# Patient Record
Sex: MALE | Race: WHITE | NOT HISPANIC OR LATINO | Employment: OTHER | URBAN - METROPOLITAN AREA
[De-identification: names, ages, dates, MRNs, and addresses within clinical notes are randomized per-mention and may not be internally consistent; named-entity substitution may affect disease eponyms.]

---

## 2017-01-06 ENCOUNTER — PREP FOR SURGERY (OUTPATIENT)
Dept: BARIATRICS/WEIGHT MGMT | Facility: CLINIC | Age: 67
End: 2017-01-06

## 2017-01-06 DIAGNOSIS — K43.2 INCISIONAL HERNIA, WITHOUT OBSTRUCTION OR GANGRENE: Primary | ICD-10-CM

## 2017-01-17 ENCOUNTER — APPOINTMENT (OUTPATIENT)
Dept: PREADMISSION TESTING | Facility: HOSPITAL | Age: 67
End: 2017-01-17

## 2017-01-17 LAB
HCT VFR BLD AUTO: 44.7 % (ref 38.9–50.9)
POTASSIUM BLD-SCNC: 4.8 MMOL/L (ref 3.5–5.5)

## 2017-01-17 PROCEDURE — 84132 ASSAY OF SERUM POTASSIUM: CPT | Performed by: SURGERY

## 2017-01-17 PROCEDURE — 93010 ELECTROCARDIOGRAM REPORT: CPT | Performed by: INTERNAL MEDICINE

## 2017-01-17 PROCEDURE — 36415 COLL VENOUS BLD VENIPUNCTURE: CPT

## 2017-01-17 PROCEDURE — 85014 HEMATOCRIT: CPT | Performed by: SURGERY

## 2017-01-17 PROCEDURE — 93005 ELECTROCARDIOGRAM TRACING: CPT

## 2017-01-23 ENCOUNTER — LAB REQUISITION (OUTPATIENT)
Dept: LAB | Facility: HOSPITAL | Age: 67
End: 2017-01-23

## 2017-01-23 DIAGNOSIS — K43.0 INCISIONAL HERNIA, WITH OBSTRUCTION, WITHOUT GANGRENE: ICD-10-CM

## 2017-01-23 PROCEDURE — 88302 TISSUE EXAM BY PATHOLOGIST: CPT | Performed by: SURGERY

## 2017-01-24 LAB
CYTO UR: NORMAL
LAB AP CASE REPORT: NORMAL
LAB AP CLINICAL INFORMATION: NORMAL
Lab: NORMAL
PATH REPORT.FINAL DX SPEC: NORMAL
PATH REPORT.GROSS SPEC: NORMAL

## 2017-02-03 ENCOUNTER — OFFICE VISIT (OUTPATIENT)
Dept: BARIATRICS/WEIGHT MGMT | Facility: CLINIC | Age: 67
End: 2017-02-03

## 2017-02-03 VITALS
RESPIRATION RATE: 18 BRPM | TEMPERATURE: 97.8 F | WEIGHT: 204 LBS | SYSTOLIC BLOOD PRESSURE: 98 MMHG | BODY MASS INDEX: 32.78 KG/M2 | DIASTOLIC BLOOD PRESSURE: 62 MMHG | HEIGHT: 66 IN | OXYGEN SATURATION: 99 % | HEART RATE: 76 BPM

## 2017-02-03 DIAGNOSIS — K43.2 INCISIONAL HERNIA, WITHOUT OBSTRUCTION OR GANGRENE: ICD-10-CM

## 2017-02-03 DIAGNOSIS — Z98.890 STATUS POST SURGERY: Primary | ICD-10-CM

## 2017-02-03 PROCEDURE — 99024 POSTOP FOLLOW-UP VISIT: CPT | Performed by: PHYSICIAN ASSISTANT

## 2017-02-03 RX ORDER — HYDROCODONE BITARTRATE AND ACETAMINOPHEN 7.5; 325 MG/1; MG/1
TABLET ORAL
COMMUNITY
Start: 2017-01-23 | End: 2017-09-18

## 2017-02-03 NOTE — PROGRESS NOTES
[facility] BARIATRIC SURGERY  [address]  [facility] [address]  [phone]    Eloy Molina.  1950    Date of Visit:   2/3/2017    Reason for Visit:  1 week Follow Up IHR  HPI:    66 y.o. year old male s/p LSG/HHR by GDW 9/17/15, s/p uneventful Incisional HR by Dr. Olson  on 1/23/17. Doing well now. No further issues w/abdominal pain. Tolerating PO w/out issue. Denies fever, nausea, vomiting and abdominal pain. Bowels are moving. Voiding well.   Past Medical History   Diagnosis Date   • Abnormal chest x-ray      extensive pulm fibrosis   • Abnormal PFTs (pulmonary function tests)      severe restriction. 59/62, dlco39   • Benign essential HTN    • CAD (coronary artery disease)      s/p stenting in 2006   • CHF (congestive heart failure)      with diastolic dysfunction   • Hiatal hernia      asx, path DE neg   • History of MI (myocardial infarction)      2006   • Hyperlipidemia    • KATIE on CPAP      compliant w/3L O2 nightly     Past Surgical History   Procedure Laterality Date   • Appendectomy  2015   • Knee surgery Bilateral 2012     TKR and partial left   • Colonoscopy  2012     benign polyps   • Coronary angioplasty with stent placement  2006     Dr. Aranda       Current Outpatient Prescriptions:   •  aspirin 81 MG tablet, Take  by mouth daily., Disp: , Rfl:   •  carvedilol (COREG) 3.125 MG tablet, Take  by mouth 2 (two) times a day., Disp: , Rfl:   •  cetirizine (ZyrTEC) 10 MG tablet, Take  by mouth daily., Disp: , Rfl:   •  clopidogrel (PLAVIX) 75 MG tablet, Take  by mouth daily., Disp: , Rfl:   •  ezetimibe-simvastatin (VYTORIN) 10-80 MG per tablet, Take  by mouth daily., Disp: , Rfl:   •  fluocinonide (LIDEX) 0.05 % gel, Apply  topically., Disp: , Rfl:   •  HYDROcodone-acetaminophen (NORCO) 7.5-325 MG per tablet, , Disp: , Rfl:   •  lisinopril (PRINIVIL,ZESTRIL) 10 MG tablet, Take  by mouth daily., Disp: , Rfl:   •  Multiple Vitamins-Iron (DAILY  "MULTIVITAMINS/IRON PO), Take  by mouth daily., Disp: , Rfl:   •  simvastatin (ZOCOR) 40 MG tablet, , Disp: , Rfl:   No Known Allergies  Social History     Social History   • Marital status:      Spouse name: N/A   • Number of children: N/A   • Years of education: N/A     Occupational History   • Not on file.     Social History Main Topics   • Smoking status: Former Smoker     Packs/day: 2.00     Years: 25.00     Quit date: 1995   • Smokeless tobacco: Never Used   • Alcohol use No      Comment: alcoholic sober since 1983   • Drug use: No   • Sexual activity: Not on file     Other Topics Concern   • Not on file     Social History Narrative    Lives in [place] w/wife    Retired, previously worked in [employer] sales     Visit Vitals   • BP 98/62 (BP Location: Left arm, Patient Position: Sitting, Cuff Size: Large Adult)   • Pulse 76   • Temp 97.8 °F (36.6 °C) (Temporal Artery )   • Resp 18   • Ht 66\" (167.6 cm)   • Wt 204 lb (92.5 kg)   • SpO2 99%   • BMI 32.93 kg/m2        General Appearance:  Well nourished.  In no acute distress.  Patient was observed to be obese.  Oral Cavity:   Buccal Mucosa: The buccal mucosa was moist.  Lungs:  Normal breath sounds/voice sounds.  Cardiovascular:   Heart Rate And Rhythm: Heart rate and rhythm normal.   Edema: No calf tenderness.  Abdomen:  Abdomen: incisions healing well.   Auscultation: The bowel sounds were normal.   Palpation: No mass was palpated in the abdomen, Soft, nontender, and nondistended.   Hernia: No hernia was discovered.  Musculoskeletal System:   General/bilateral: No edema present in extremities.  Normal movement of all extremities.  Neurological:  Was alert and oriented.   Gait And Stance: Gait and stance were normal.  Psychiatric:   Attitude: The attitude was cooperative.   Mood: Mood pleasant.  Skin:  The complexion was normal.  The skin moisture was normal and the skin temperature was normal.    Assessment:   2 weeks s/p Incisional HR;  the " patient is doing well.           Plan:   Call w/issues and concerns  RTC with problems.    FLOR Harper  2/3/2017

## 2017-09-18 ENCOUNTER — OFFICE VISIT (OUTPATIENT)
Dept: BARIATRICS/WEIGHT MGMT | Facility: CLINIC | Age: 67
End: 2017-09-18

## 2017-09-18 VITALS
RESPIRATION RATE: 18 BRPM | BODY MASS INDEX: 30.86 KG/M2 | OXYGEN SATURATION: 95 % | HEIGHT: 66 IN | WEIGHT: 192 LBS | TEMPERATURE: 97.1 F | SYSTOLIC BLOOD PRESSURE: 109 MMHG | DIASTOLIC BLOOD PRESSURE: 71 MMHG | HEART RATE: 86 BPM

## 2017-09-18 DIAGNOSIS — I25.10 CHRONIC CORONARY ARTERY DISEASE: Primary | ICD-10-CM

## 2017-09-18 DIAGNOSIS — E55.9 VITAMIN D DEFICIENCY: ICD-10-CM

## 2017-09-18 DIAGNOSIS — E78.5 HYPERLIPIDEMIA, UNSPECIFIED HYPERLIPIDEMIA TYPE: ICD-10-CM

## 2017-09-18 DIAGNOSIS — R79.0 ABNORMAL BLOOD LEVEL OF IRON: ICD-10-CM

## 2017-09-18 DIAGNOSIS — R10.13 DYSPEPSIA: ICD-10-CM

## 2017-09-18 DIAGNOSIS — I10 BENIGN ESSENTIAL HTN: ICD-10-CM

## 2017-09-18 PROCEDURE — 99214 OFFICE O/P EST MOD 30 MIN: CPT | Performed by: PHYSICIAN ASSISTANT

## 2017-09-18 NOTE — PROGRESS NOTES
[facility] Bariatric Surgery  [address]  [facility] [address]  [phone]        Patient Name:  Eloy Molina.  :  1950      Date of Visit: 2017      Reason for Visit:   Annual Eval      HPI: Eloy Molina is a 67 y.o. male 2 years s/p LSG/HHR by GDW on 9/17/15, followed by lap IHR 17.    Doing well - feeling great.  No issues/concerns. Denies dysphagia, reflux, nausea, vomiting and abdominal pain.  Getting minimum 100g prot/day and carbs 130-165g/day.  Has logged consistently >1000 days on [employer].  Gets 2000 inocencia/day.  Extremely pleased.  Down from a size 58 waist to 40.  Has reached his weight loss goal.  Rewarded himself and bought a Porchse.    Last labs 2016 WNL.  Continues on a daily MVI.  Exercises 5 days/week - avid golfer.       Presurgery weight: 280 pounds.  Today's weight is 192 lb (87.1 kg) pounds, today's  Body mass index is 30.99 kg/(m^2)., and his weight loss since surgery is 88 pounds.      Past Medical History:   Diagnosis Date   • Abnormal chest x-ray     extensive pulm fibrosis   • Abnormal PFTs (pulmonary function tests)      - severe restriction. 59/62, dlco39   • Benign essential HTN    • CAD (coronary artery disease)     s/p stenting in    • CHF (congestive heart failure)     with diastolic dysfunction   • Dyspepsia    • Dyspnea on exertion    • History of MI (myocardial infarction)        • Hyperlipidemia    • Joint pain    • KATIE on CPAP     compliant      Past Surgical History:   Procedure Laterality Date   • APPENDECTOMY     • COLONOSCOPY  2012    benign polyps   • CORONARY ANGIOPLASTY WITH STENT PLACEMENT      Dr. Aranda   • KNEE SURGERY Bilateral 2012    TKR and partial left     Outpatient Prescriptions Marked as Taking for the 17 encounter (Office Visit) with FLOR Quevedo   Medication Sig Dispense Refill   • aspirin 81 MG tablet Take  by mouth daily.     • carvedilol (COREG)  "3.125 MG tablet Take  by mouth 2 (two) times a day.     • cetirizine (ZyrTEC) 10 MG tablet Take  by mouth daily.     • clopidogrel (PLAVIX) 75 MG tablet Take  by mouth daily.     • fluocinonide (LIDEX) 0.05 % gel Apply  topically.     • lisinopril (PRINIVIL,ZESTRIL) 10 MG tablet Take  by mouth daily.     • Multiple Vitamins-Iron (DAILY MULTIVITAMINS/IRON PO) Take  by mouth daily.     • simvastatin (ZOCOR) 40 MG tablet          No Known Allergies    Social History     Social History   • Marital status:      Spouse name: N/A   • Number of children: N/A   • Years of education: N/A     Occupational History   • Not on file.     Social History Main Topics   • Smoking status: Former Smoker     Packs/day: 2.00     Years: 25.00     Quit date: 1995   • Smokeless tobacco: Never Used   • Alcohol use No      Comment: alcoholic sober since 1983   • Drug use: No   • Sexual activity: Not on file     Other Topics Concern   • Not on file     Social History Narrative    Lives in [place] w/wife    Retired, previously worked in [employer] sales       /71 (BP Location: Right arm, Patient Position: Sitting, Cuff Size: Large Adult)  Pulse 86  Temp 97.1 °F (36.2 °C) (Temporal Artery )   Resp 18  Ht 66\" (167.6 cm)  Wt 192 lb (87.1 kg)  SpO2 95%  BMI 30.99 kg/m2    Physical Exam   Constitutional: He appears well-developed and well-nourished.   Cardiovascular: Normal rate and regular rhythm.    Pulmonary/Chest: Effort normal.   Abdominal: Soft. There is no tenderness. No hernia.   Musculoskeletal: Normal range of motion.   Neurological: He is alert.   Skin: Skin is warm and dry.   Psychiatric: He has a normal mood and affect.         Assessment:  2 years s/p LSG/HHR by GDW on 9/17/15, followed by kate IHR 1/23/17.    ICD-10-CM ICD-9-CM   1. Chronic coronary artery disease I25.10 414.00   2. Benign essential HTN I10 401.1   3. Hyperlipidemia, unspecified hyperlipidemia type E78.5 272.4   4. Dyspepsia R10.13 536.8 "   5. Vitamin D deficiency E55.9 268.9   6. Abnormal blood level of iron R79.0 790.6       Plan:  Continue w/ good food choices and healthy habits.  Keep tracking intake.  Continue routine exercise.  Routine bariatric labs ordered.  Continue vitamins w/ adjustments pending lab results.  Call w/ problems/concerns.     The patient was instructed to follow up in 1 year, sooner if needed.      Total time spent w/ patient 25 minutes and 15 minutes spent counseling the patient on nutrition and necessary dietary/lifestyle modifications.

## 2017-09-20 LAB
25(OH)D3+25(OH)D2 SERPL-MCNC: 34.9 NG/ML
A-TOCOPHEROL VIT E SERPL-MCNC: 10.8 MG/L (ref 5.3–17.5)
ALBUMIN SERPL-MCNC: 4.2 G/DL (ref 3.2–4.8)
ALBUMIN/GLOB SERPL: 1.6 G/DL (ref 1.5–2.5)
ALP SERPL-CCNC: 126 U/L (ref 25–100)
ALT SERPL-CCNC: 21 U/L (ref 7–40)
AST SERPL-CCNC: 22 U/L (ref 0–33)
BASOPHILS # BLD AUTO: 0.04 10*3/MM3 (ref 0–0.2)
BASOPHILS NFR BLD AUTO: 0.5 % (ref 0–1)
BILIRUB SERPL-MCNC: 0.7 MG/DL (ref 0.3–1.2)
BUN SERPL-MCNC: 18 MG/DL (ref 9–23)
BUN/CREAT SERPL: 22.5 (ref 7–25)
CALCIUM SERPL-MCNC: 9.5 MG/DL (ref 8.7–10.4)
CHLORIDE SERPL-SCNC: 102 MMOL/L (ref 99–109)
CO2 SERPL-SCNC: 29 MMOL/L (ref 20–31)
CREAT SERPL-MCNC: 0.8 MG/DL (ref 0.6–1.3)
EOSINOPHIL # BLD AUTO: 0.16 10*3/MM3 (ref 0–0.3)
EOSINOPHIL NFR BLD AUTO: 2 % (ref 0–3)
ERYTHROCYTE [DISTWIDTH] IN BLOOD BY AUTOMATED COUNT: 13.4 % (ref 11.3–14.5)
FERRITIN SERPL-MCNC: 26 NG/ML (ref 22–322)
FOLATE SERPL-MCNC: 18.56 NG/ML (ref 3.2–20)
GLOBULIN SER CALC-MCNC: 2.7 GM/DL
GLUCOSE SERPL-MCNC: 72 MG/DL (ref 70–100)
HCT VFR BLD AUTO: 44.1 % (ref 38.9–50.9)
HGB BLD-MCNC: 14.6 G/DL (ref 13.1–17.5)
IMM GRANULOCYTES # BLD: 0.02 10*3/MM3 (ref 0–0.03)
IMM GRANULOCYTES NFR BLD: 0.3 % (ref 0–0.6)
IRON SERPL-MCNC: 121 MCG/DL (ref 50–175)
LYMPHOCYTES # BLD AUTO: 2.04 10*3/MM3 (ref 0.6–4.8)
LYMPHOCYTES NFR BLD AUTO: 25.8 % (ref 24–44)
MAGNESIUM SERPL-MCNC: 2 MG/DL (ref 1.3–2.7)
MCH RBC QN AUTO: 31.9 PG (ref 27–31)
MCHC RBC AUTO-ENTMCNC: 33.1 G/DL (ref 32–36)
MCV RBC AUTO: 96.3 FL (ref 80–99)
METHYLMALONATE SERPL-SCNC: 239 NMOL/L (ref 0–378)
MONOCYTES # BLD AUTO: 0.79 10*3/MM3 (ref 0–1)
MONOCYTES NFR BLD AUTO: 10 % (ref 0–12)
NEUTROPHILS # BLD AUTO: 4.86 10*3/MM3 (ref 1.5–8.3)
NEUTROPHILS NFR BLD AUTO: 61.4 % (ref 41–71)
PHOSPHATE SERPL-MCNC: 3.5 MG/DL (ref 2.4–5.1)
PLATELET # BLD AUTO: 248 10*3/MM3 (ref 150–450)
POTASSIUM SERPL-SCNC: 5.2 MMOL/L (ref 3.5–5.5)
PREALB SERPL-MCNC: 19 MG/DL (ref 10–36)
PROT SERPL-MCNC: 6.9 G/DL (ref 5.7–8.2)
PTH-INTACT SERPL-MCNC: 28 PG/ML (ref 15–65)
RBC # BLD AUTO: 4.58 10*6/MM3 (ref 4.2–5.76)
SODIUM SERPL-SCNC: 140 MMOL/L (ref 132–146)
VIT A SERPL-MCNC: 45 UG/DL (ref 24–85)
VIT B1 BLD-SCNC: 187 NMOL/L (ref 66.5–200)
WBC # BLD AUTO: 7.91 10*3/MM3 (ref 3.5–10.8)
ZINC SERPL-MCNC: 77 UG/DL (ref 56–134)

## 2018-09-17 ENCOUNTER — OFFICE VISIT (OUTPATIENT)
Dept: BARIATRICS/WEIGHT MGMT | Facility: CLINIC | Age: 68
End: 2018-09-17

## 2018-09-17 VITALS
WEIGHT: 193.01 LBS | DIASTOLIC BLOOD PRESSURE: 60 MMHG | HEIGHT: 66 IN | OXYGEN SATURATION: 99 % | SYSTOLIC BLOOD PRESSURE: 112 MMHG | HEART RATE: 72 BPM | BODY MASS INDEX: 31.02 KG/M2 | TEMPERATURE: 97.7 F | RESPIRATION RATE: 18 BRPM

## 2018-09-17 DIAGNOSIS — Z98.84 S/P BARIATRIC SURGERY: ICD-10-CM

## 2018-09-17 DIAGNOSIS — R10.13 DYSPEPSIA: Primary | ICD-10-CM

## 2018-09-17 PROCEDURE — 99213 OFFICE O/P EST LOW 20 MIN: CPT | Performed by: PHYSICIAN ASSISTANT

## 2018-09-17 RX ORDER — AMOXICILLIN 500 MG/1
1000 CAPSULE ORAL 2 TIMES DAILY
COMMUNITY
End: 2018-09-17

## 2018-09-17 NOTE — PROGRESS NOTES
"[facility] Bariatric Surgery  [address]  [facility] [address] [phone]        Patient Name:  lEoy Molina.  :  1950      Date of Visit: 2018      Reason for Visit:   Annual Eval - 3 years postop    HPI: Eloy Molina is a 68 y.o. male s/p LSG/HHR by GDW on 9/17/15, followed by lap IHR 17.    Feeling really good.  \"Life-changing.\"  Maintaining weight loss.  Only issue is heartburn/indigestion.  Uses Gaviscon prn which typically works.  Stays active, playing golf and now bowling.  Still logs faithfully on [employer].  Gets 1800 inocencia/day w/ 100g prot/day.  Walks 10,000 steps/day.  Labs 2017 WNL  Continues on a daily MVI.       Presurgery weight: 280 pounds.  Today's weight is 87.5 kg (193 lb 0.2 oz) pounds, today's  Body mass index is 31.15 kg/m²., and his weight loss since surgery is 87 pounds.      Past Medical History:   Diagnosis Date   • Abnormal chest x-ray     extensive pulm fibrosis   • Abnormal PFTs (pulmonary function tests)      - severe restriction. 59/62, dlco39   • Benign essential HTN    • CAD (coronary artery disease)     s/p stenting in    • CHF (congestive heart failure) (CMS/HCC)     with diastolic dysfunction   • Dyspepsia    • Dyspnea on exertion    • History of MI (myocardial infarction)        • Hyperlipidemia    • Joint pain    • Sleep apnea     resolved w/ WLS, no longer requires device     Past Surgical History:   Procedure Laterality Date   • APPENDECTOMY      (lap) Dr. Stokes, [facility]   • COLONOSCOPY      benign polyps   • CORONARY ANGIOPLASTY WITH STENT PLACEMENT      Dr. Aranda   • GASTRIC SLEEVE LAPAROSCOPIC      s/p LSG/HHR by GDW on 9/17/15   • INCISIONAL HERNIA REPAIR      lap IHR by GDW on 17   • KNEE ARTHROPLASTY, PARTIAL REPLACEMENT Left    • REPLACEMENT TOTAL KNEE Right      Outpatient Prescriptions Marked as Taking for the 18 encounter (Office Visit) with " "Sepideh Shepherd PA   Medication Sig Dispense Refill   • aspirin 81 MG tablet Take  by mouth daily.     • carvedilol (COREG) 3.125 MG tablet Take  by mouth 2 (two) times a day.     • cetirizine (ZyrTEC) 10 MG tablet Take  by mouth daily.     • clopidogrel (PLAVIX) 75 MG tablet Take  by mouth daily.     • fluocinonide (LIDEX) 0.05 % gel Apply  topically.     • lisinopril (PRINIVIL,ZESTRIL) 10 MG tablet Take  by mouth daily.     • Multiple Vitamins-Iron (DAILY MULTIVITAMINS/IRON PO) Take  by mouth daily.     • simvastatin (ZOCOR) 40 MG tablet      • [DISCONTINUED] amoxicillin (AMOXIL) 500 MG capsule Take 1,000 mg by mouth 2 (Two) Times a Day.         No Known Allergies    Social History     Social History   • Marital status:      Spouse name: N/A   • Number of children: N/A   • Years of education: N/A     Occupational History   • Not on file.     Social History Main Topics   • Smoking status: Former Smoker     Packs/day: 2.00     Years: 25.00     Quit date: 1995   • Smokeless tobacco: Never Used   • Alcohol use No      Comment: alcoholic sober since 1983   • Drug use: No   • Sexual activity: Not on file     Other Topics Concern   • Not on file     Social History Narrative    Lives in [place] w/wife    Retired, previously worked in [employer] sales       /60 (BP Location: Left arm, Patient Position: Sitting, Cuff Size: Large Adult)   Pulse 72   Temp 97.7 °F (36.5 °C) (Temporal Artery )   Resp 18   Ht 167.6 cm (66\")   Wt 87.5 kg (193 lb 0.2 oz)   SpO2 99%   BMI 31.15 kg/m²     Physical Exam   Constitutional: He appears well-developed and well-nourished.   Cardiovascular: Normal rate and regular rhythm.    Pulmonary/Chest: Effort normal.   Abdominal: Soft. There is no tenderness. No hernia.   Musculoskeletal: Normal range of motion.   Neurological: He is alert.   Skin: Skin is warm and dry.   Psychiatric: He has a normal mood and affect.         Assessment:  3 years s/p LSG/HHR by HEBER on " 9/17/15, followed by kate IHR 1/23/17.    ICD-10-CM ICD-9-CM   1. Dyspepsia R10.13 536.8   2. S/P bariatric surgery Z98.84 V45.86       Plan:  Doing well.  Continue w/ healthy habits.  Keep tracking intake - focus on high protein, low carb, 1500 inocencia/day.  No labs today.  Does not wish to w/up heartburn further at this point.  Call w/ any other problems/concerns.     The patient was instructed to follow up in 1 year, sooner if needed.

## 2019-03-12 ENCOUNTER — TELEPHONE (OUTPATIENT)
Dept: BARIATRICS/WEIGHT MGMT | Facility: CLINIC | Age: 69
End: 2019-03-12

## 2019-03-12 RX ORDER — OMEPRAZOLE 40 MG/1
40 CAPSULE, DELAYED RELEASE ORAL DAILY
Qty: 30 CAPSULE | Refills: 0 | Status: SHIPPED | OUTPATIENT
Start: 2019-03-12 | End: 2019-03-18 | Stop reason: SDUPTHER

## 2019-03-12 NOTE — TELEPHONE ENCOUNTER
Pt called in stating that he is having severe problems with acid reflux, and it continuously is getting worse and does not know what he needs to do or wants to know if he needs to be seen. Please advise, thank you.

## 2019-03-13 NOTE — TELEPHONE ENCOUNTER
Notified pt that you sent in a rx for Omeprazole 40mg into his pharmacy and he needs to start taking that daily. I also let pt know that you want him to be seen in the office for further eval. Pt verbalized understanding.

## 2019-03-13 NOTE — TELEPHONE ENCOUNTER
Tried to contact pt, no answer. LVM   Writer spoke with patient and informed them of the information below.  Patient verbalized understanding of information and stated they will contact our office if her symptoms do not improve in the next few days.

## 2019-03-18 ENCOUNTER — OFFICE VISIT (OUTPATIENT)
Dept: BARIATRICS/WEIGHT MGMT | Facility: CLINIC | Age: 69
End: 2019-03-18

## 2019-03-18 VITALS
HEART RATE: 71 BPM | DIASTOLIC BLOOD PRESSURE: 64 MMHG | HEIGHT: 66 IN | OXYGEN SATURATION: 95 % | WEIGHT: 204.5 LBS | TEMPERATURE: 97 F | SYSTOLIC BLOOD PRESSURE: 120 MMHG | BODY MASS INDEX: 32.87 KG/M2 | RESPIRATION RATE: 18 BRPM

## 2019-03-18 DIAGNOSIS — K21.9 GASTROESOPHAGEAL REFLUX DISEASE, ESOPHAGITIS PRESENCE NOT SPECIFIED: Primary | ICD-10-CM

## 2019-03-18 DIAGNOSIS — Z98.84 S/P BARIATRIC SURGERY: ICD-10-CM

## 2019-03-18 PROCEDURE — 99213 OFFICE O/P EST LOW 20 MIN: CPT | Performed by: SURGERY

## 2019-03-18 RX ORDER — OMEPRAZOLE 40 MG/1
40 CAPSULE, DELAYED RELEASE ORAL DAILY
Qty: 90 CAPSULE | Refills: 3 | Status: SHIPPED | OUTPATIENT
Start: 2019-03-18 | End: 2020-03-13

## 2019-03-18 NOTE — PROGRESS NOTES
[facility] Bariatric Surgery  [address]  [facility] [address] [phone]        Patient Name: Eloy Molina.  YOB: 1950      Date of Visit: 03/18/2019      Reason for Visit:  GERD    HPI:  Eloy Molina is a 68 y.o. male s/p LSG/HHR by HEBER on 9/17/15, followed by kate IHR 1/23/17.    LOV 9/2018 for his yearly visit.  Had GERD, but did not desire workup at that time as it was not severe.  He was just using PRN gaviscon at that time--10-12 pills per day!  Over past month, heartburn much much more severe.  When he eats, gets a cramp in epigastrium.  He was rx omeprazole 40 mg daily.  Has been taking x 1 week, and all symptoms are gone.  Has only needed 4 Gaviscon the entire week since starting the PPI.  Once he had chili, once he had pizza.      Has gained 11 pounds since last visit.  Notes he is always 10 pounds heavier in the winter.  Logs faithfully on [employer] (150 day streak!).   g/day protein.  Keeps carbs <150, sugar <30.  Calorie goal: 2000.  Has lost >100 pounds since sleeve.    Reviewed recent CBC/CMP/lipid panel ordered by PCP.    Had recent excision of mole on his right hand, had to come back for reshaving of margins.  Unsure of what type of skin cancer it was.        Past Medical History:   Diagnosis Date   • Abnormal chest x-ray     extensive pulm fibrosis   • Abnormal PFTs (pulmonary function tests)     2015 - severe restriction. 59/62, dlco39   • Benign essential HTN    • CAD (coronary artery disease)     s/p stenting in 2006   • CHF (congestive heart failure) (CMS/HCC)     with diastolic dysfunction   • Dyspepsia    • Dyspnea on exertion    • History of MI (myocardial infarction)     2006   • Hyperlipidemia    • Joint pain    • Sleep apnea     resolved w/ WLS, no longer requires device     Past Surgical History:   Procedure Laterality Date   • APPENDECTOMY  2015    ([facility]) Dr. Stokes, [facility]   • COLONOSCOPY  2012    benign polyps   •  CORONARY ANGIOPLASTY WITH STENT PLACEMENT      Dr. Aranda   • GASTRIC SLEEVE LAPAROSCOPIC  2015    s/p LSG/HHR by HEBER on 9/17/15   • INCISIONAL HERNIA REPAIR  2017    lap IHR by HEBER on 17   • KNEE ARTHROPLASTY, PARTIAL REPLACEMENT Left    • REPLACEMENT TOTAL KNEE Right      Outpatient Medications Marked as Taking for the 3/18/19 encounter (Office Visit) with Angela Peguero MD   Medication Sig Dispense Refill   • aspirin 81 MG tablet Take  by mouth daily.     • carvedilol (COREG) 3.125 MG tablet Take  by mouth 2 (two) times a day.     • cetirizine (ZyrTEC) 10 MG tablet Take  by mouth daily.     • fluocinonide (LIDEX) 0.05 % gel Apply  topically.     • lisinopril (PRINIVIL,ZESTRIL) 10 MG tablet Take  by mouth daily.     • Multiple Vitamins-Iron (DAILY MULTIVITAMINS/IRON PO) Take  by mouth daily.     • omeprazole (priLOSEC) 40 MG capsule Take 1 capsule by mouth Daily. 90 capsule 3   • simvastatin (ZOCOR) 40 MG tablet      • [DISCONTINUED] omeprazole (priLOSEC) 40 MG capsule Take 1 capsule by mouth Daily. 30 capsule 0     No Known Allergies    Social History     Socioeconomic History   • Marital status:      Spouse name: Not on file   • Number of children: Not on file   • Years of education: Not on file   • Highest education level: Not on file   Social Needs   • Financial resource strain: Not on file   • Food insecurity - worry: Not on file   • Food insecurity - inability: Not on file   • Transportation needs - medical: Not on file   • Transportation needs - non-medical: Not on file   Occupational History   • Not on file   Tobacco Use   • Smoking status: Former Smoker     Packs/day: 2.00     Years: 25.00     Pack years: 50.00     Last attempt to quit:      Years since quittin.2   • Smokeless tobacco: Never Used   Substance and Sexual Activity   • Alcohol use: No     Comment: alcoholic sober since    • Drug use: No   • Sexual activity: Not on file   Other Topics Concern    • Not on file   Social History Narrative    Lives in [place] w/wife    Retired, previously worked in [employer] sales       Vitals:    03/18/19 1511   BP: 120/64   Pulse: 71   Resp: 18   Temp: 97 °F (36.1 °C)   SpO2: 95%     Weight 92.8 kg (204 lb 8 oz)  Body mass index is 33.01 kg/m².    Physical Exam   Constitutional: He is oriented to person, place, and time. He appears well-developed and well-nourished. No distress.   HENT:   Head: Normocephalic and atraumatic.   Mouth/Throat: No oropharyngeal exudate.   Eyes: Conjunctivae and EOM are normal. Pupils are equal, round, and reactive to light.   Pulmonary/Chest: Effort normal. No respiratory distress.   Abdominal: Soft. He exhibits no distension.   Neurological: He is alert and oriented to person, place, and time. No cranial nerve deficit.   Skin: Skin is warm and dry. He is not diaphoretic. No pallor.   Psychiatric: He has a normal mood and affect. His behavior is normal. Thought content normal.         Assessment:      ICD-10-CM ICD-9-CM   1. Gastroesophageal reflux disease, esophagitis presence not specified K21.9 530.81   2. S/P bariatric surgery Z98.84 V45.86       Plan:     GERD much improved on PPI.  Offered workup with UGI and EGD.  He has declined, will revisit issue at yearly appt in September.          The patient was instructed to follow up in 6 months .

## 2019-09-16 ENCOUNTER — OFFICE VISIT (OUTPATIENT)
Dept: BARIATRICS/WEIGHT MGMT | Facility: CLINIC | Age: 69
End: 2019-09-16

## 2019-09-16 VITALS
DIASTOLIC BLOOD PRESSURE: 66 MMHG | OXYGEN SATURATION: 98 % | RESPIRATION RATE: 18 BRPM | BODY MASS INDEX: 31.98 KG/M2 | WEIGHT: 199 LBS | SYSTOLIC BLOOD PRESSURE: 110 MMHG | HEART RATE: 58 BPM | HEIGHT: 66 IN | TEMPERATURE: 96.4 F

## 2019-09-16 DIAGNOSIS — E66.9 OBESITY, CLASS I, BMI 30-34.9: Primary | ICD-10-CM

## 2019-09-16 PROCEDURE — 99213 OFFICE O/P EST LOW 20 MIN: CPT | Performed by: PHYSICIAN ASSISTANT

## 2019-09-16 RX ORDER — SILDENAFIL CITRATE 20 MG/1
20 TABLET ORAL 3 TIMES DAILY
COMMUNITY
Start: 2019-07-16

## 2019-09-16 RX ORDER — AMOXICILLIN 500 MG/1
500 CAPSULE ORAL 2 TIMES DAILY
COMMUNITY
Start: 2019-08-15 | End: 2021-09-22

## 2019-09-16 RX ORDER — CLOPIDOGREL BISULFATE 75 MG/1
TABLET ORAL
COMMUNITY
Start: 2019-09-09 | End: 2020-09-14

## 2019-09-16 RX ORDER — ROSUVASTATIN CALCIUM 5 MG/1
1 TABLET, COATED ORAL
COMMUNITY
Start: 2019-03-07

## 2019-09-16 NOTE — PROGRESS NOTES
[facility] Bariatric Surgery  [address]  [facility] [address] [phone]        Patient Name:  Eloy Molina.  :  1950      Date of Visit: 2019      Reason for Visit:   Annual Eval - 4 years postop    HPI: Eloy Molina is a 69 y.o. male s/p LSG/HHR by GDW on 9/17/15, followed by lap IHR 17.    LOV 2018 for annual eval - c/o heartburn/indigestion, but did not wish to evaluate further.  No labs done.     Since last visit has been dx w/ Pulmonary HTN - says was getting extremely short of breath while mowing the lawn, which had not been an issue for him since he lost weight.  Eval still pending, following w/ pulmonary to r/o idiopathic pulmonary fibrosis.  Now on nocturnal O2 w/ portable O2 prn.  Still golfing 4x/week and bowling weekly.  Says otherwise he is feeling really good, just can't really mow the lawn anymore.      Continues tracking intake faithfully on [employer] - maintaining weight loss.  Getting 100g prot/day.  Taking Omeprazole 40mg daily - no further issues w/ heartburn.  No other issues/concerns.     Last bariatric labs 2017 - full panel - WNL.  Taking daily MVI.  PCP labs 3/2019 reviewed.    Presurgery weight: 280 pounds.  Today's weight is 90.3 kg (199 lb) pounds, today's  Body mass index is 32.12 kg/m²., and his weight loss since surgery is 81 pounds.      Past Medical History:   Diagnosis Date   • Abnormal chest x-ray     extensive pulm fibrosis   • Abnormal PFTs (pulmonary function tests)      - severe restriction. 59/62, dlco39   • Benign essential HTN    • CAD (coronary artery disease)     s/p stenting in    • CHF (congestive heart failure) (CMS/HCC)     with diastolic dysfunction   • Dyspepsia    • Dyspnea on exertion    • Fatigue    • History of MI (myocardial infarction)        • Hyperlipidemia    • Joint pain    • Pulmonary hypertension (CMS/HCC)     newly dx 2019, poss pulmonary fibrosis, following w/  pulmonary @[facility].   • Sleep apnea     resolved w/ WLS, no longer requires device     Past Surgical History:   Procedure Laterality Date   • APPENDECTOMY      (lap) Dr. Sotkes, [facility] [place]   • COLONOSCOPY      benign polyps   • CORONARY ANGIOPLASTY WITH STENT PLACEMENT      Dr. Aranda   • GASTRIC SLEEVE LAPAROSCOPIC  2015    s/p LSG/HHR by GDW on 9/17/15   • INCISIONAL HERNIA REPAIR      lap IHR by GDW on 17   • KNEE ARTHROPLASTY, PARTIAL REPLACEMENT Left    • REPLACEMENT TOTAL KNEE Right      Outpatient Medications Marked as Taking for the 19 encounter (Office Visit) with Sepideh Shepherd PA   Medication Sig Dispense Refill   • amoxicillin (AMOXIL) 500 MG capsule      • aspirin 81 MG tablet Take  by mouth daily.     • carvedilol (COREG) 3.125 MG tablet Take  by mouth 2 (two) times a day.     • cetirizine (ZyrTEC) 10 MG tablet Take  by mouth daily.     • clopidogrel (PLAVIX) 75 MG tablet      • fluocinonide (LIDEX) 0.05 % gel Apply  topically.     • lisinopril (PRINIVIL,ZESTRIL) 10 MG tablet Take  by mouth daily.     • Multiple Vitamins-Iron (DAILY MULTIVITAMINS/IRON PO) Take  by mouth daily.     • omeprazole (priLOSEC) 40 MG capsule Take 1 capsule by mouth Daily. 90 capsule 3   • rosuvastatin (CRESTOR) 40 MG tablet Take 1 tablet by mouth.     • sildenafil (REVATIO) 20 MG tablet          No Known Allergies    Social History     Socioeconomic History   • Marital status:      Spouse name: Not on file   • Number of children: Not on file   • Years of education: Not on file   • Highest education level: Not on file   Tobacco Use   • Smoking status: Former Smoker     Packs/day: 2.00     Years: 25.00     Pack years: 50.00     Last attempt to quit:      Years since quittin.7   • Smokeless tobacco: Never Used   Substance and Sexual Activity   • Alcohol use: No     Comment: alcoholic sober since    • Drug use: No   Social History Narrative    Lives in [place] w/wife     "Retired, previously worked in [employer] sales       /66 (BP Location: Left arm, Patient Position: Sitting, Cuff Size: Adult)   Pulse 58   Temp 96.4 °F (35.8 °C) (Temporal)   Resp 18   Ht 167.6 cm (66\")   Wt 90.3 kg (199 lb)   SpO2 98%   BMI 32.12 kg/m²     Physical Exam   Constitutional: He appears well-developed and well-nourished.   Cardiovascular: Normal rate and regular rhythm.   Pulmonary/Chest: Effort normal. He has wheezes.   Abdominal: Soft. There is no tenderness. No hernia.   Musculoskeletal: Normal range of motion.   Neurological: He is alert.   Skin: Skin is warm and dry.   Psychiatric: He has a normal mood and affect.         Assessment:  4 years s/p LSG/HHR by HEBER on 9/17/15, followed by lap IHR 1/23/17.    ICD-10-CM ICD-9-CM   1. Obesity, Class I, BMI 30-34.9 E66.9 278.00       Plan:  Doing well from a bariatric standpoint.  Continue w/ healthy habits.  Keep tracking intake - focus on high protein, low carb, good food choices.  No labs needed today - scheduled for f/up w/ PCP 10/2019.  Continue PPI.  Call w/ issues/concerns.     The patient was instructed to follow up in 1 year, sooner if needed.          "

## 2020-03-13 RX ORDER — OMEPRAZOLE 40 MG/1
CAPSULE, DELAYED RELEASE ORAL
Qty: 90 CAPSULE | Refills: 3 | Status: SHIPPED | OUTPATIENT
Start: 2020-03-13

## 2020-09-14 ENCOUNTER — OFFICE VISIT (OUTPATIENT)
Dept: BARIATRICS/WEIGHT MGMT | Facility: CLINIC | Age: 70
End: 2020-09-14

## 2020-09-14 VITALS
WEIGHT: 199 LBS | HEIGHT: 66 IN | OXYGEN SATURATION: 97 % | RESPIRATION RATE: 18 BRPM | HEART RATE: 66 BPM | SYSTOLIC BLOOD PRESSURE: 100 MMHG | BODY MASS INDEX: 31.98 KG/M2 | TEMPERATURE: 98.4 F | DIASTOLIC BLOOD PRESSURE: 50 MMHG

## 2020-09-14 DIAGNOSIS — Z13.21 MALNUTRITION SCREEN: ICD-10-CM

## 2020-09-14 DIAGNOSIS — K21.9 GASTROESOPHAGEAL REFLUX DISEASE, ESOPHAGITIS PRESENCE NOT SPECIFIED: ICD-10-CM

## 2020-09-14 DIAGNOSIS — E55.9 HYPOVITAMINOSIS D: ICD-10-CM

## 2020-09-14 DIAGNOSIS — R53.83 FATIGUE, UNSPECIFIED TYPE: Primary | ICD-10-CM

## 2020-09-14 DIAGNOSIS — E61.1 IRON DEFICIENCY: ICD-10-CM

## 2020-09-14 DIAGNOSIS — Z13.21 SCREENING FOR MALNUTRITION: ICD-10-CM

## 2020-09-14 DIAGNOSIS — Z98.84 STATUS POST BARIATRIC SURGERY: ICD-10-CM

## 2020-09-14 PROCEDURE — 99214 OFFICE O/P EST MOD 30 MIN: CPT | Performed by: PHYSICIAN ASSISTANT

## 2020-09-14 RX ORDER — AMBRISENTAN 5 MG/1
5 TABLET, FILM COATED ORAL DAILY
COMMUNITY
Start: 2020-08-31 | End: 2021-09-22

## 2020-09-14 RX ORDER — FUROSEMIDE 20 MG/1
20 TABLET ORAL DAILY
COMMUNITY

## 2020-09-14 RX ORDER — PREDNISONE 10 MG/1
10 TABLET ORAL DAILY
COMMUNITY
End: 2022-09-21

## 2020-09-14 NOTE — PROGRESS NOTES
[facility] Bariatric Surgery   [address]    [facility] [address] [phone]        Patient Name:  Eloy Molina.  :  1950        Reason for Visit:   Annual Eval  3.5 years postop    HPI: Eloy Molina is a 70 y.o. male s/p LSG/HHR by GDW on 9/17/15, followed by lap IHR 17.    Doing well. Has had a challenging year.  Being followed for pulm HTN and lung fibrosis.  Being evaluated for lung transpant at  and expects to be a good candidate.  Thankful for bariatric surgery, took him from a place of being unhappy with daily life to allowing for 4 really great years. Went from feeling 89 to 34y old. Now dealing with this hiccup, significantly limiting physical activity. Although able to gold and  Bowl with oxygen. Gained some weight while on high dose long term steroids but worked to get that back off.  No GI issues/concerns. Reflux is controlled on daily omeprazole. Denies dysphagia, nausea, vomiting, abdominal pain and fevers.  Getting 70-100g prot/day. Has tracked in Cumulux pal 292 days in a row, very routine.  Eating minimum 3 times a day.  Breakfast- 300cal/ 20g protein. Boiled egg. Regimented lunch and dinner. Grapes or PB, biscoff cookies for evening snack. occ treat blue bunny ice cream sandwich.  carbs <170g, sugar <30g.  Struggles with sodium.   Drinking 64 fluid oz/day.  Last labs revealed bariatric levels wnl .  Taking MVI and iron.  On Omeprazole .  Exercise limited with pulm issues, planning to start pulm rehab.     Presurgery weight: 280 pounds.  Today's weight is 90.3 kg (199 lb) pounds, today's  Body mass index is 32.12 kg/m²., and@ weight loss since surgery is 81 pounds.      Past Medical History:   Diagnosis Date   • Abnormal chest x-ray     extensive pulm fibrosis   • Abnormal PFTs (pulmonary function tests)      - severe restriction. 59/62, dlco39   • Benign essential HTN    • CAD (coronary artery disease)     s/p stenting in    •  CHF (congestive heart failure) (CMS/HCC)     with diastolic dysfunction   • Dyspepsia    • Dyspnea on exertion    • Fatigue    • History of MI (myocardial infarction)     2006   • Hyperlipidemia    • Joint pain    • Pulmonary hypertension (CMS/HCC)     newly dx 4/2019, poss pulmonary fibrosis, following w/ pulmonary @[facility].   • Sleep apnea     resolved w/ WLS, no longer requires device     Past Surgical History:   Procedure Laterality Date   • APPENDECTOMY  2015    (lap) Dr. Stokes, [facility]   • COLONOSCOPY  2012    benign polyps   • CORONARY ANGIOPLASTY WITH STENT PLACEMENT  2006    Dr. Aranda   • GASTRIC SLEEVE LAPAROSCOPIC  2015    s/p LSG/HHR by GDW on 9/17/15   • INCISIONAL HERNIA REPAIR  2017    lap IHR by GDW on 1/23/17   • KNEE ARTHROPLASTY, PARTIAL REPLACEMENT Left 2012   • REPLACEMENT TOTAL KNEE Right 2012     Outpatient Medications Marked as Taking for the 9/14/20 encounter (Office Visit) with Daly Guerrero PA-C   Medication Sig Dispense Refill   • Advair Diskus 250-50 MCG/DOSE DISKUS Inhale 1 puff 2 (Two) Times a Day.     • ambrisentan (LETAIRIS) 5 MG tablet Take 5 mg by mouth Daily.     • aspirin 81 MG tablet Take  by mouth daily.     • carvedilol (COREG) 3.125 MG tablet Take  by mouth 2 (two) times a day.     • cetirizine (ZyrTEC) 10 MG tablet Take  by mouth daily.     • Fluticasone Propionate, Inhal, 250 MCG/BLIST aerosol powder  Inhale 1 puff 2 (two) times a day.     • furosemide (LASIX) 20 MG tablet Take 20 mg by mouth Daily.     • Multiple Vitamins-Iron (DAILY MULTIVITAMINS/IRON PO) Take  by mouth daily.     • omeprazole (priLOSEC) 40 MG capsule TAKE 1 CAPSULE DAILY 90 capsule 3   • predniSONE (DELTASONE) 10 MG tablet Take 10 mg by mouth Daily.     • rosuvastatin (CRESTOR) 40 MG tablet Take 1 tablet by mouth.     • sildenafil (REVATIO) 20 MG tablet Take 20 mg by mouth 3 (Three) Times a Day.         No Known Allergies    Social History     Socioeconomic History   • Marital status:       "Spouse name: Not on file   • Number of children: Not on file   • Years of education: Not on file   • Highest education level: Not on file   Tobacco Use   • Smoking status: Former Smoker     Packs/day: 2.00     Years: 25.00     Pack years: 50.00     Quit date:      Years since quittin.7   • Smokeless tobacco: Never Used   Substance and Sexual Activity   • Alcohol use: No     Comment: alcoholic sober since    • Drug use: No   Social History Narrative    Lives in [place] w/wife    Retired, previously worked in [employer] sales       /50 (BP Location: Left arm, Patient Position: Sitting, Cuff Size: Large Adult)   Pulse 66   Temp 98.4 °F (36.9 °C) (Temporal)   Resp 18   Ht 167.6 cm (66\")   Wt 90.3 kg (199 lb)   SpO2 97%   BMI 32.12 kg/m²     Physical Exam  Constitutional:       Appearance: He is well-developed.   HENT:      Head: Normocephalic and atraumatic.      Comments: Wearing mask  Cardiovascular:      Rate and Rhythm: Normal rate and regular rhythm.   Pulmonary:      Effort: Pulmonary effort is normal.      Comments: On NC oxygen  Abdominal:      General: Bowel sounds are normal.      Palpations: Abdomen is soft.      Comments: Superior umbilicus midline bulge   Neurological:      Mental Status: He is alert.   Psychiatric:         Mood and Affect: Mood normal.         Thought Content: Thought content normal.         Judgment: Judgment normal.           Assessment:  3.5y s/p LSG/HHR by GDW on 9/17/15, followed by lap IHR 17.    ICD-10-CM ICD-9-CM   1. Fatigue, unspecified type  R53.83 780.79   2. Gastroesophageal reflux disease, esophagitis presence not specified  K21.9 530.81   3. Status post bariatric surgery  Z98.84 V45.86   4. Iron deficiency  E61.1 280.9   5. Malnutrition screen  Z13.21 V77.2   6. Screening for malnutrition  Z13.21 V77.2   7. Hypovitaminosis D  E55.9 268.9         Plan:  Cont PPI. Continue w/ good food choices and healthy habits.  Continue to focus on " high protein, low carb.  Keep tracking intake.  Continue routine exercise as able with pulm rehab.  Routine bariatric labs ordered.  Continue vitamins w/ adjustments pending lab results.  Call w/ problems/concerns.     Patient's Body mass index is 32.12 kg/m². BMI is above normal parameters. Recommendations include: exercise counseling and nutrition counseling.        The patient was instructed to follow up in 1 year, sooner if needed.      Total time spent w/ patient 25 minutes and 15 minutes spent counseling the patient on nutrition and necessary dietary/lifestyle modifications.

## 2020-09-22 LAB
25(OH)D3+25(OH)D2 SERPL-MCNC: 40.3 NG/ML (ref 30–100)
FERRITIN SERPL-MCNC: 22.9 NG/ML (ref 30–400)
FOLATE SERPL-MCNC: >20 NG/ML (ref 4.78–24.2)
IRON SERPL-MCNC: 38 MCG/DL (ref 59–158)
Lab: NORMAL
METHYLMALONATE SERPL-SCNC: 232 NMOL/L (ref 0–378)
PREALB SERPL-MCNC: 32 MG/DL (ref 9–32)
VIT B1 BLD-SCNC: 172.6 NMOL/L (ref 66.5–200)

## 2021-09-22 ENCOUNTER — OFFICE VISIT (OUTPATIENT)
Dept: BARIATRICS/WEIGHT MGMT | Facility: CLINIC | Age: 71
End: 2021-09-22

## 2021-09-22 VITALS
BODY MASS INDEX: 31.9 KG/M2 | HEIGHT: 66 IN | WEIGHT: 198.5 LBS | RESPIRATION RATE: 18 BRPM | TEMPERATURE: 97.6 F | DIASTOLIC BLOOD PRESSURE: 64 MMHG | HEART RATE: 54 BPM | SYSTOLIC BLOOD PRESSURE: 120 MMHG | OXYGEN SATURATION: 99 %

## 2021-09-22 DIAGNOSIS — E66.9 OBESITY, CLASS I, BMI 30-34.9: Primary | ICD-10-CM

## 2021-09-22 DIAGNOSIS — R53.83 FATIGUE, UNSPECIFIED TYPE: ICD-10-CM

## 2021-09-22 DIAGNOSIS — Z13.21 MALNUTRITION SCREEN: ICD-10-CM

## 2021-09-22 DIAGNOSIS — E55.9 HYPOVITAMINOSIS D: ICD-10-CM

## 2021-09-22 DIAGNOSIS — K91.2 POSTGASTRECTOMY MALABSORPTION: ICD-10-CM

## 2021-09-22 DIAGNOSIS — Z90.3 POSTGASTRECTOMY MALABSORPTION: ICD-10-CM

## 2021-09-22 DIAGNOSIS — Z13.0 SCREENING, IRON DEFICIENCY ANEMIA: ICD-10-CM

## 2021-09-22 DIAGNOSIS — Z98.84 STATUS POST BARIATRIC SURGERY: ICD-10-CM

## 2021-09-22 PROCEDURE — 99214 OFFICE O/P EST MOD 30 MIN: CPT | Performed by: PHYSICIAN ASSISTANT

## 2021-09-22 RX ORDER — ACETAMINOPHEN 500 MG
500 TABLET ORAL EVERY 6 HOURS PRN
COMMUNITY

## 2021-09-22 RX ORDER — TACROLIMUS 1 MG/1
1 CAPSULE ORAL 2 TIMES DAILY
COMMUNITY
Start: 2021-07-13 | End: 2022-07-13

## 2021-09-22 RX ORDER — PHENOL 1.4 %
10 AEROSOL, SPRAY (ML) MUCOUS MEMBRANE DAILY
COMMUNITY
End: 2022-09-21

## 2021-09-22 RX ORDER — SULFAMETHOXAZOLE AND TRIMETHOPRIM 400; 80 MG/1; MG/1
TABLET ORAL
COMMUNITY
Start: 2021-08-31

## 2021-09-22 RX ORDER — MULTIPLE VITAMINS W/ MINERALS TAB 9MG-400MCG
1 TAB ORAL DAILY
COMMUNITY

## 2021-09-22 RX ORDER — VALGANCICLOVIR 450 MG/1
450 TABLET, FILM COATED ORAL DAILY
COMMUNITY
Start: 2021-09-16 | End: 2022-09-21

## 2021-09-22 RX ORDER — FLUDROCORTISONE ACETATE 0.1 MG/1
0.1 TABLET ORAL DAILY
COMMUNITY
Start: 2021-09-08

## 2021-09-22 RX ORDER — POSACONAZOLE 100 MG/1
300 TABLET, DELAYED RELEASE ORAL DAILY
COMMUNITY
Start: 2021-09-03 | End: 2022-09-21

## 2021-09-22 RX ORDER — LOPERAMIDE HYDROCHLORIDE 2 MG/1
CAPSULE ORAL
COMMUNITY
Start: 2021-09-20

## 2021-09-22 RX ORDER — AMOXICILLIN AND CLAVULANATE POTASSIUM 875; 125 MG/1; MG/1
1 TABLET, FILM COATED ORAL 2 TIMES DAILY
COMMUNITY
Start: 2021-07-28

## 2021-09-22 RX ORDER — MYCOPHENOLIC ACID 180 MG/1
180 TABLET, DELAYED RELEASE ORAL 2 TIMES DAILY
COMMUNITY
Start: 2021-07-07 | End: 2022-09-21

## 2021-09-22 NOTE — PROGRESS NOTES
"[facility] Bariatric Surgery  [address]    [facility] [address] [phone]        Patient Name:  Eloy Molina.  :  1950        Reason for Visit:   6 years postop      HPI: Eloy Molina is a 71 y.o. male  male s/p LSG/HHR by GDW on 9/17/15, followed by lap IHR 17.    Doing well. S/p L lung transplant 2020 and doing great. \"miracle\". Feeling good. Saw surgeon at  for recurrent of small hiatal hernia who recommended no surgical intervention at this time. Take BID PPI and has very rare reflux.  Weight remains stable and pleased with weightloss.  No other  issues/concerns. Denies dysphagia, nausea, vomiting and abdominal pain.  Getting 70+g prot/day.  3 meals a day.  1800cal/ day diet. Drinking 64+ fluid oz/day.  Last labs revealed low iron,  .  Taking MVI, host of other vitamins.  On Omeprazole BID.  Exercising- very active, golf, walking, dancing.     Presurgery weight: 280 pounds.  Today's weight is 90 kg (198 lb 8 oz) pounds, today's  Body mass index is 32.04 kg/m²., and@ weight loss since surgery is 82 pounds.      Past Medical History:   Diagnosis Date   • Abnormal chest x-ray     extensive pulm fibrosis   • Abnormal PFTs (pulmonary function tests)      - severe restriction. 59/62, dlco39   • Benign essential HTN    • CAD (coronary artery disease)     s/p stenting in    • CHF (congestive heart failure) (CMS/HCC)     with diastolic dysfunction   • Dyspepsia    • Dyspnea on exertion    • Fatigue    • History of MI (myocardial infarction)        • Hyperlipidemia    • Joint pain    • Pulmonary hypertension (CMS/HCC)     newly dx 2019, poss pulmonary fibrosis, following w/ pulmonary @[facility].   • Sleep apnea     resolved w/ WLS, no longer requires device     Past Surgical History:   Procedure Laterality Date   • APPENDECTOMY      (lap) Dr. Stokes, [facility] [place]   • COLONOSCOPY      benign polyps   • CORONARY ANGIOPLASTY WITH STENT " PLACEMENT  2006    Dr. Aranda   • GASTRIC SLEEVE LAPAROSCOPIC  2015    s/p LSG/HHR by HEBER on 9/17/15   • INCISIONAL HERNIA REPAIR  2017    lap IHR by ANGELESW on 1/23/17   • KNEE ARTHROPLASTY, PARTIAL REPLACEMENT Left 2012   • REPLACEMENT TOTAL KNEE Right 2012     Outpatient Medications Marked as Taking for the 9/22/21 encounter (Office Visit) with Daly Guerrero PA-C   Medication Sig Dispense Refill   • acetaminophen (TYLENOL) 500 MG tablet Take 500 mg by mouth Every 6 (Six) Hours As Needed for Mild Pain .     • aluminum hydroxide-mag carbonate (GAVISCON EXTRA RELIEF) 160-105 MG chewable tablet chewable tablet Chew 1 tablet.     • aspirin 81 MG tablet Take  by mouth daily.     • calcium carbonate-cholecalciferol 500-400 MG-UNIT tablet tablet Take 1 tablet by mouth Daily.     • cetirizine (ZyrTEC) 10 MG tablet Take  by mouth daily.     • fludrocortisone 0.1 MG tablet Take 0.1 mg by mouth Daily.     • furosemide (LASIX) 20 MG tablet Take 20 mg by mouth Daily.     • loperamide (IMODIUM) 2 MG capsule      • magnesium oxide (MAGOX) 400 (241.3 Mg) MG tablet tablet Take 400 mg by mouth Daily.     • Melatonin 10 MG tablet Take 10 mg by mouth Daily.     • metoprolol tartrate (LOPRESSOR) 25 MG tablet Take 25 mg by mouth 2 (Two) Times a Day.     • multivitamin with minerals (Centrum Silver 50+Men) tablet tablet Take 1 tablet by mouth Daily.     • mycophenolate (MYFORTIC) 180 MG EC tablet Take 180 mg by mouth 2 (Two) Times a Day.     • nystatin (MYCOSTATIN) 822776 UNIT/ML suspension Take 500,000 Units by mouth 2 (two) times a day.     • omeprazole (priLOSEC) 40 MG capsule TAKE 1 CAPSULE DAILY 90 capsule 3   • posaconazole (NOXAFIL) 100 MG tablet delayed-release EC tablet Take 300 mg by mouth Daily.     • predniSONE (DELTASONE) 10 MG tablet Take 10 mg by mouth Daily.     • rosuvastatin (Crestor) 5 MG tablet Take 1 tablet by mouth.     • sulfamethoxazole-trimethoprim (BACTRIM,SEPTRA) 400-80 MG tablet      • tacrolimus  "(PROGRAF) 1 MG capsule Take 1 mg by mouth 2 (Two) Times a Day.     • valGANciclovir (VALCYTE) 450 MG tablet Take 450 mg by mouth Daily.         No Known Allergies    Social History     Socioeconomic History   • Marital status:      Spouse name: Not on file   • Number of children: Not on file   • Years of education: Not on file   • Highest education level: Not on file   Tobacco Use   • Smoking status: Former Smoker     Packs/day: 2.00     Years: 25.00     Pack years: 50.00     Quit date:      Years since quittin.7   • Smokeless tobacco: Never Used   Substance and Sexual Activity   • Alcohol use: No     Comment: alcoholic sober since    • Drug use: No       /64 (BP Location: Left arm, Patient Position: Sitting, Cuff Size: Large Adult)   Pulse 54   Temp 97.6 °F (36.4 °C) (Temporal)   Resp 18   Ht 167.6 cm (66\")   Wt 90 kg (198 lb 8 oz)   SpO2 99%   BMI 32.04 kg/m²     Physical Exam  Constitutional:       Appearance: He is well-developed. He is obese.   HENT:      Head: Normocephalic and atraumatic.   Cardiovascular:      Rate and Rhythm: Normal rate and regular rhythm.   Pulmonary:      Effort: Pulmonary effort is normal.      Breath sounds: Normal breath sounds.   Abdominal:      General: Bowel sounds are normal.      Palpations: Abdomen is soft.   Skin:     General: Skin is warm and dry.   Neurological:      Mental Status: He is alert.   Psychiatric:         Mood and Affect: Mood normal.         Behavior: Behavior normal.         Thought Content: Thought content normal.         Judgment: Judgment normal.           Assessment:  6 years s/p LSG/HHR by HEBER on 9/17/15, followed by kate IHR 17.      ICD-10-CM ICD-9-CM   1. Obesity, Class I, BMI 30-34.9  E66.9 278.00   2. Fatigue, unspecified type  R53.83 780.79   3. Screening, iron deficiency anemia  Z13.0 V78.0   4. Postgastrectomy malabsorption  K91.2 579.3    Z90.3    5. Status post bariatric surgery  Z98.84 V45.86   6. " Hypovitaminosis D  E55.9 268.9   7. Malnutrition screen  Z13.21 V77.2         Plan:  Doing well. Cont PPI, let us know if reflux symptoms are uncontrolled, we can further evaluate with Dr. Olson.  Continue w/ good food choices and healthy habits.  Continue to focus on high protein, low carb.  Keep tracking intake.  Continue routine exercise.  Routine bariatric labs ordered.  Continue vitamins w/ adjustments pending lab results.  Call w/ problems/concerns.     Patient's Body mass index is 32.04 kg/m². indicating that he is obese (BMI >30). Obesity-related health conditions include the following: as above. Obesity is improving with treatment. BMI is is above average; BMI management plan is completed. We discussed low calorie, low carb based diet program, portion control and increasing exercise..        The patient was instructed to follow up in 1 year, sooner if needed.

## 2021-09-29 LAB
25(OH)D3+25(OH)D2 SERPL-MCNC: 57.2 NG/ML (ref 30–100)
FERRITIN SERPL-MCNC: 25.7 NG/ML (ref 30–400)
FOLATE SERPL-MCNC: >20 NG/ML (ref 4.78–24.2)
IRON SERPL-MCNC: 17 MCG/DL (ref 59–158)
Lab: NORMAL
METHYLMALONATE SERPL-SCNC: 375 NMOL/L (ref 0–378)
PREALB SERPL-MCNC: 32 MG/DL (ref 9–32)
VIT B1 BLD-SCNC: 169.2 NMOL/L (ref 66.5–200)

## 2022-09-21 ENCOUNTER — OFFICE VISIT (OUTPATIENT)
Dept: BARIATRICS/WEIGHT MGMT | Facility: CLINIC | Age: 72
End: 2022-09-21

## 2022-09-21 VITALS
TEMPERATURE: 98 F | BODY MASS INDEX: 36.32 KG/M2 | HEART RATE: 77 BPM | OXYGEN SATURATION: 97 % | RESPIRATION RATE: 18 BRPM | WEIGHT: 226 LBS | DIASTOLIC BLOOD PRESSURE: 80 MMHG | SYSTOLIC BLOOD PRESSURE: 112 MMHG | HEIGHT: 66 IN

## 2022-09-21 DIAGNOSIS — E66.9 OBESITY, CLASS II, BMI 35-39.9: Primary | ICD-10-CM

## 2022-09-21 DIAGNOSIS — E55.9 VITAMIN D DEFICIENCY: ICD-10-CM

## 2022-09-21 DIAGNOSIS — R79.0 ABNORMAL BLOOD LEVEL OF IRON: ICD-10-CM

## 2022-09-21 DIAGNOSIS — E53.8 VITAMIN B12 DEFICIENCY: ICD-10-CM

## 2022-09-21 PROBLEM — J30.9 ALLERGIC RHINITIS: Status: ACTIVE | Noted: 2022-09-21

## 2022-09-21 PROBLEM — K21.9 GERD (GASTROESOPHAGEAL REFLUX DISEASE): Status: ACTIVE | Noted: 2022-09-21

## 2022-09-21 PROCEDURE — 99214 OFFICE O/P EST MOD 30 MIN: CPT | Performed by: PHYSICIAN ASSISTANT

## 2022-09-21 RX ORDER — TACROLIMUS 0.5 MG/1
CAPSULE ORAL
COMMUNITY
Start: 2022-07-06

## 2022-09-21 RX ORDER — AZITHROMYCIN 250 MG/1
TABLET, FILM COATED ORAL
COMMUNITY
Start: 2022-09-10

## 2022-09-21 RX ORDER — MYCOPHENOLIC ACID 180 MG/1
TABLET, DELAYED RELEASE ORAL 2 TIMES DAILY
COMMUNITY
End: 2022-09-21

## 2022-09-21 RX ORDER — CHOLECALCIFEROL (VITAMIN D3) 125 MCG
10 CAPSULE ORAL
COMMUNITY
Start: 2022-05-13

## 2022-09-21 RX ORDER — MYCOPHENOLIC ACID 180 MG/1
540 TABLET, DELAYED RELEASE ORAL
COMMUNITY
Start: 2022-03-04 | End: 2023-03-04

## 2022-09-21 RX ORDER — APIXABAN 5 MG/1
5 TABLET, FILM COATED ORAL 2 TIMES DAILY
COMMUNITY
Start: 2022-08-12

## 2022-09-21 RX ORDER — FERROUS SULFATE 324(65)MG
324 TABLET, DELAYED RELEASE (ENTERIC COATED) ORAL
COMMUNITY

## 2022-09-21 RX ORDER — ASPIRIN 81 MG/1
81 TABLET ORAL DAILY
COMMUNITY

## 2022-09-21 RX ORDER — DEXAMETHASONE 2 MG/1
TABLET ORAL
COMMUNITY
Start: 2022-07-28 | End: 2022-09-21

## 2022-09-21 RX ORDER — PREDNISONE 1 MG/1
5 TABLET ORAL DAILY
COMMUNITY

## 2022-09-21 NOTE — PROGRESS NOTES
"[facility] Bariatric Surgery  [address]    [facility] [address] [phone]        Patient Name:  Eloy Molina.  :  1950        Reason for Visit:   Annual Eval - 7 years postop      HPI: Eloy Molina is a 72 y.o. male s/p LSG/HHR 9/17/15 GDW, followed by lap IHR 17 GDW.    s/p (L) lung transplant 2020 @ for pulmonary fibrosis, follows w/ transplant team q60 days, on tacrolimus, mycophenolate, azithromycin, and prednisone for anti-rejection RX.     Had COVID 2022, then hospitalized x7 days w/ (L) lung PE + (R) lung PNA.  Since that time says has gained 25+lbs which concerns him.  Says transplant team does not seem as concerned, but he is not happy about it - pants are getting tighter!  Then had COVID 2022 treated w/ Dexamethasone, which he is not sure if that has contributed negatively.      Otherwise he feels good.  Gets 8635-9106 inocencia/day w/ 70-100g prot/day.  Tracks intake faithfully.  Admits he does have an affinity for sweets.  Says he probably needs to go back on his [employer] diet and eliminate anything white from his diet.  Typically plays golf 2-3x/week.  Goes to the gym 1-2x/week.  Averages 5000 steps/day.     Reflux controlled w/ BID PPI.  Denies dysphagia, nausea, vomiting and abdominal pain.     Last bariatric labs 21 - high MMA, low iron.  Taking MVI, iron, and B12 1000mcg daily.     Presurgery weight: 280 pounds.  Today's weight is 103 kg (226 lb) pounds, today's  Body mass index is 36.48 kg/m²., and weight loss since surgery is 54 pounds.      Past Medical History:   Diagnosis Date   • Allergic rhinitis     Dr. Abarca says it is \"Gustatory Rhinitis\", sneezes when he eats   • Benign essential HTN    • CAD (coronary artery disease)     s/p stenting in    • CHF (congestive heart failure) (HCC)     with diastolic dysfunction   • Dyspepsia    • Dyspnea on exertion    • Fatigue    • GERD (gastroesophageal reflux disease)     " mostly postprandial, on BID PPI   • History of MI (myocardial infarction)     2006   • Hyperlipidemia    • Joint pain    • Pulmonary hypertension (HCC)     dx 4/2019, poss pulmonary fibrosis, s/p lung transplant 12/2020 @[facility].   • Sleep apnea     resolved w/ WLS, no longer requires device     Past Surgical History:   Procedure Laterality Date   • APPENDECTOMY  2015    (lap) Dr. Stokes, [facility] [place]   • COLONOSCOPY  2012    benign polyps   • CORONARY ANGIOPLASTY WITH STENT PLACEMENT  2006    Dr. Aranda   • EYE SURGERY  2018    Cataract surgery   • GASTRIC SLEEVE LAPAROSCOPIC  2015    s/p LSG/HHR by GDW on 9/17/15   • INCISIONAL HERNIA REPAIR  2017    lap IHR by GDW on 1/23/17   • KNEE ARTHROPLASTY, PARTIAL REPLACEMENT Left 2012   • LUNG TRANSPLANT  2020    @[facility]   • REPLACEMENT TOTAL KNEE Right 2012     Outpatient Medications Marked as Taking for the 9/21/22 encounter (Office Visit) with Sepideh Shepherd PA   Medication Sig Dispense Refill   • acetaminophen (TYLENOL) 500 MG tablet Take 500 mg by mouth Every 6 (Six) Hours As Needed for Mild Pain .     • aspirin 81 MG EC tablet Take 81 mg by mouth Daily.     • azithromycin (ZITHROMAX) 250 MG tablet      • cetirizine (ZyrTEC) 10 MG tablet Take  by mouth daily.     • cyanocobalamin (VITAMIN B-12) 500 MCG tablet Take 1,000 mcg by mouth Daily.     • Eliquis 5 MG tablet tablet 5 mg 2 (Two) Times a Day.     • ferrous sulfate 324 MG tablet delayed-release Take 324 mg by mouth.     • fludrocortisone 0.1 MG tablet Take 0.1 mg by mouth Daily.     • furosemide (LASIX) 20 MG tablet Take 20 mg by mouth Daily.     • magnesium oxide (MAGOX) 400 (241.3 Mg) MG tablet tablet Take 400 mg by mouth Daily.     • melatonin 5 MG tablet tablet Take 10 mg by mouth.     • metoprolol tartrate (LOPRESSOR) 25 MG tablet Take 12.5 mg by mouth 2 (Two) Times a Day.     • multivitamin with minerals tablet tablet Take 1 tablet by mouth Daily.     • mycophenolate (MYFORTIC) 180 MG EC tablet Take 540 mg by  "mouth.     • omeprazole (priLOSEC) 40 MG capsule TAKE 1 CAPSULE DAILY (Patient taking differently: 40 mg 2 (Two) Times a Day.) 90 capsule 3   • predniSONE (DELTASONE) 5 MG tablet Take 5 mg by mouth Daily.     • rosuvastatin (CRESTOR) 5 MG tablet Take 1 tablet by mouth.     • sulfamethoxazole-trimethoprim (BACTRIM,SEPTRA) 400-80 MG tablet      • tacrolimus (PROGRAF) 0.5 MG capsule Take  by mouth.     • [DISCONTINUED] calcium carbonate-cholecalciferol 500-400 MG-UNIT tablet tablet Take 1 tablet by mouth Daily.     • [DISCONTINUED] dexamethasone (DECADRON) 2 MG tablet      • [DISCONTINUED] fluocinonide (LIDEX) 0.05 % gel Apply 1 application topically to the appropriate area as directed 2 (Two) Times a Day.     • [DISCONTINUED] Fluticasone Propionate, Inhal, 250 MCG/BLIST aerosol powder  Inhale 1 puff 2 (two) times a day.     • [DISCONTINUED] metoprolol tartrate (LOPRESSOR) 25 MG tablet Take 12.5 mg by mouth.     • [DISCONTINUED] mycophenolate (MYFORTIC) 180 MG EC tablet Take  by mouth 2 (Two) Times a Day.         No Known Allergies    Social History     Socioeconomic History   • Marital status:    Tobacco Use   • Smoking status: Former Smoker     Packs/day: 2.00     Years: 25.00     Pack years: 50.00     Types: Cigarettes     Start date: 5/1/1975     Quit date: 9/30/2006     Years since quitting: 15.9   • Smokeless tobacco: Never Used   Substance and Sexual Activity   • Alcohol use: No     Comment: alcoholic sober since 1983   • Drug use: No   • Sexual activity: Yes     Partners: Female     Birth control/protection: None       /80 (BP Location: Left arm, Patient Position: Sitting)   Pulse 77   Temp 98 °F (36.7 °C)   Resp 18   Ht 167.6 cm (66\")   Wt 103 kg (226 lb)   SpO2 97%   BMI 36.48 kg/m²     Physical Exam  Constitutional:       General: He is not in acute distress.     Appearance: He is well-developed. He is not diaphoretic.      Comments: wearing a mask   Cardiovascular:      Rate and Rhythm: " Normal rate.   Pulmonary:      Effort: Pulmonary effort is normal.   Neurological:      Mental Status: He is alert and oriented to person, place, and time.           Assessment:  7 years s/p LSG/HHR 9/17/15 GDW, followed by kate IHR 1/23/17 GDW.      ICD-10-CM ICD-9-CM   1. Obesity, Class II, BMI 35-39.9  E66.9 278.00   2. Vitamin B12 deficiency  E53.8 266.2   3. Abnormal blood level of iron  R79.0 790.6   4. Vitamin D deficiency  E55.9 268.9     Class 2 Severe Obesity (BMI >=35 and <=39.9). Obesity-related health conditions include the following: see above. Obesity is improving with lifestyle modifications. BMI is is above average; BMI management plan is completed. We discussed see plan.      Plan:  Encouraged to continue to focus on healthy habits.  Keep tracking intake.  Reduce calories to 1600/day w/ higher protein, lower carb choices.  Followup with dietitian in next few weeks.  Continue w/ exercise.  Offered referral for Exercise Evaluation if desired.  Routine bariatric labs ordered.  Continue vitamins w/ adjustments pending lab results.  Call w/ problems/concerns.       The patient was instructed to follow up in 6 months, sooner if needed.

## 2022-09-26 LAB
25(OH)D3+25(OH)D2 SERPL-MCNC: 52.9 NG/ML (ref 30–100)
FERRITIN SERPL-MCNC: 108 NG/ML (ref 30–400)
FOLATE SERPL-MCNC: >20 NG/ML (ref 4.78–24.2)
IRON SERPL-MCNC: 140 MCG/DL (ref 59–158)
METHYLMALONATE SERPL-SCNC: 212 NMOL/L (ref 0–378)
PREALB SERPL-MCNC: 29 MG/DL (ref 9–32)
VIT B1 BLD-SCNC: 238 NMOL/L (ref 66.5–200)

## 2022-10-03 ENCOUNTER — DOCUMENTATION (OUTPATIENT)
Dept: BARIATRICS/WEIGHT MGMT | Facility: CLINIC | Age: 72
End: 2022-10-03

## 2022-10-03 NOTE — PROGRESS NOTES
"Mr Molina was referred by medical staff ([facility] Bethea) due to his concern that he is re-gaining weight. Has done extremely well losing weight and maintaining loss over the past 7 years, in spite of such roadblocks as worsening of pulmonary fibrosis, resulting in necessity of lung TX in Dec, 2020.  Other complication included hospitalization with Covid in 01/2022.  He keeps meticulous records using [employer], though he is tiring of it somewhat and requested information on other tracking apps.  Provided that information via handout sent to him.  Has kept an active schedule since LSG, and maintained high level of activity even post lung TX. After hospitalization for Covid in Dec, 2021, states he \"let up\" on activity for 3 months from February through  April, 2022, but is now back at it - currently bowling and working out at the gym twice weekly. After review of his records and graphs, note that he has had a 20# weight gain from 200 on 04/08/2022 through 08/26/2022 when his weight was ~220 #.  Review of food records indicates that he eats few if any between meal snacks, high protein or otherwise, and that some meals are relatively high in carbohydrate in comparison with protein. No longer drinking protein shakes, but he does regularly eat [employer] bars (170 inocencia, 24 g protein & 7 grams carb). Calories range from 1550 to 1985 per day, and protein 80-90 grams per day usually.  Mr Molina expresses that he absolutely does not want to gain weight again.  Also, states that he is adhering to guidelines given to him from his TX team, including the dietitian. Emphasized need that, although he wants to prevent excessive weight gain, he needs to adhere to their advice. Further recommendations from this dietitian include need to include high protein snacks, as well as less carbohydrate such as buns and breads at lunch, especially.  Plan: send current nutrition guidelines for bariatric and metabolic surgery to patient.  He will " call the dietitians' office with questions, if any arise.